# Patient Record
Sex: FEMALE | Race: OTHER | Employment: PART TIME | URBAN - METROPOLITAN AREA
[De-identification: names, ages, dates, MRNs, and addresses within clinical notes are randomized per-mention and may not be internally consistent; named-entity substitution may affect disease eponyms.]

---

## 2019-03-22 ENCOUNTER — APPOINTMENT (OUTPATIENT)
Dept: GENERAL RADIOLOGY | Age: 44
End: 2019-03-22
Attending: PHYSICIAN ASSISTANT
Payer: OTHER GOVERNMENT

## 2019-03-22 ENCOUNTER — HOSPITAL ENCOUNTER (EMERGENCY)
Age: 44
Discharge: HOME | End: 2019-03-22
Attending: EMERGENCY MEDICINE
Payer: OTHER GOVERNMENT

## 2019-03-22 ENCOUNTER — APPOINTMENT (OUTPATIENT)
Dept: CT IMAGING | Age: 44
End: 2019-03-22
Attending: PHYSICIAN ASSISTANT
Payer: OTHER GOVERNMENT

## 2019-03-22 VITALS
HEART RATE: 63 BPM | SYSTOLIC BLOOD PRESSURE: 105 MMHG | TEMPERATURE: 97.9 F | HEIGHT: 66 IN | RESPIRATION RATE: 17 BRPM | BODY MASS INDEX: 23.3 KG/M2 | OXYGEN SATURATION: 100 % | WEIGHT: 145 LBS | DIASTOLIC BLOOD PRESSURE: 82 MMHG

## 2019-03-22 DIAGNOSIS — G44.209 ACUTE NON INTRACTABLE TENSION-TYPE HEADACHE: Primary | ICD-10-CM

## 2019-03-22 DIAGNOSIS — R25.3 MUSCLE TWITCHING: ICD-10-CM

## 2019-03-22 LAB
ALBUMIN SERPL-MCNC: 3.8 G/DL (ref 3.4–5)
ALBUMIN/GLOB SERPL: 1.2 {RATIO} (ref 0.8–1.7)
ALP SERPL-CCNC: 58 U/L (ref 45–117)
ALT SERPL-CCNC: 18 U/L (ref 13–56)
ANION GAP SERPL CALC-SCNC: 7 MMOL/L (ref 3–18)
APPEARANCE UR: CLEAR
AST SERPL-CCNC: 11 U/L (ref 15–37)
BACTERIA URNS QL MICRO: ABNORMAL /HPF
BASOPHILS # BLD: 0 K/UL (ref 0–0.1)
BASOPHILS NFR BLD: 0 % (ref 0–2)
BILIRUB SERPL-MCNC: 0.3 MG/DL (ref 0.2–1)
BILIRUB UR QL: NEGATIVE
BUN SERPL-MCNC: 23 MG/DL (ref 7–18)
BUN/CREAT SERPL: 35 (ref 12–20)
CALCIUM SERPL-MCNC: 8.6 MG/DL (ref 8.5–10.1)
CHLORIDE SERPL-SCNC: 106 MMOL/L (ref 100–108)
CK MB CFR SERPL CALC: 1.3 % (ref 0–4)
CK MB SERPL-MCNC: 1 NG/ML (ref 5–25)
CK SERPL-CCNC: 76 U/L (ref 26–192)
CO2 SERPL-SCNC: 27 MMOL/L (ref 21–32)
COLOR UR: YELLOW
CREAT SERPL-MCNC: 0.66 MG/DL (ref 0.6–1.3)
DIFFERENTIAL METHOD BLD: NORMAL
EOSINOPHIL # BLD: 0.2 K/UL (ref 0–0.4)
EOSINOPHIL NFR BLD: 3 % (ref 0–5)
EPITH CASTS URNS QL MICRO: ABNORMAL /LPF (ref 0–5)
ERYTHROCYTE [DISTWIDTH] IN BLOOD BY AUTOMATED COUNT: 12.5 % (ref 11.6–14.5)
GLOBULIN SER CALC-MCNC: 3.3 G/DL (ref 2–4)
GLUCOSE SERPL-MCNC: 84 MG/DL (ref 74–99)
GLUCOSE UR STRIP.AUTO-MCNC: NEGATIVE MG/DL
HCG UR QL: NEGATIVE
HCT VFR BLD AUTO: 38.4 % (ref 35–45)
HGB BLD-MCNC: 12.4 G/DL (ref 12–16)
HGB UR QL STRIP: ABNORMAL
KETONES UR QL STRIP.AUTO: NEGATIVE MG/DL
LEUKOCYTE ESTERASE UR QL STRIP.AUTO: ABNORMAL
LYMPHOCYTES # BLD: 2.3 K/UL (ref 0.9–3.6)
LYMPHOCYTES NFR BLD: 32 % (ref 21–52)
MAGNESIUM SERPL-MCNC: 1.9 MG/DL (ref 1.6–2.6)
MCH RBC QN AUTO: 29.2 PG (ref 24–34)
MCHC RBC AUTO-ENTMCNC: 32.3 G/DL (ref 31–37)
MCV RBC AUTO: 90.6 FL (ref 74–97)
MONOCYTES # BLD: 0.7 K/UL (ref 0.05–1.2)
MONOCYTES NFR BLD: 9 % (ref 3–10)
MUCOUS THREADS URNS QL MICRO: NEGATIVE /LPF
NEUTS SEG # BLD: 4 K/UL (ref 1.8–8)
NEUTS SEG NFR BLD: 56 % (ref 40–73)
NITRITE UR QL STRIP.AUTO: NEGATIVE
PH UR STRIP: 5 [PH] (ref 5–8)
PLATELET # BLD AUTO: 241 K/UL (ref 135–420)
PMV BLD AUTO: 9.9 FL (ref 9.2–11.8)
POTASSIUM SERPL-SCNC: 3.6 MMOL/L (ref 3.5–5.5)
PROT SERPL-MCNC: 7.1 G/DL (ref 6.4–8.2)
PROT UR STRIP-MCNC: NEGATIVE MG/DL
RBC # BLD AUTO: 4.24 M/UL (ref 4.2–5.3)
RBC #/AREA URNS HPF: ABNORMAL /HPF (ref 0–5)
SODIUM SERPL-SCNC: 140 MMOL/L (ref 136–145)
SP GR UR REFRACTOMETRY: 1.01 (ref 1–1.03)
TROPONIN I SERPL-MCNC: <0.02 NG/ML (ref 0–0.04)
UROBILINOGEN UR QL STRIP.AUTO: 0.2 EU/DL (ref 0.2–1)
WBC # BLD AUTO: 7.1 K/UL (ref 4.6–13.2)
WBC URNS QL MICRO: ABNORMAL /HPF (ref 0–4)

## 2019-03-22 PROCEDURE — 99283 EMERGENCY DEPT VISIT LOW MDM: CPT

## 2019-03-22 PROCEDURE — 70450 CT HEAD/BRAIN W/O DYE: CPT

## 2019-03-22 PROCEDURE — 82550 ASSAY OF CK (CPK): CPT

## 2019-03-22 PROCEDURE — 71046 X-RAY EXAM CHEST 2 VIEWS: CPT

## 2019-03-22 PROCEDURE — 80053 COMPREHEN METABOLIC PANEL: CPT

## 2019-03-22 PROCEDURE — 81001 URINALYSIS AUTO W/SCOPE: CPT

## 2019-03-22 PROCEDURE — 85025 COMPLETE CBC W/AUTO DIFF WBC: CPT

## 2019-03-22 PROCEDURE — 81025 URINE PREGNANCY TEST: CPT

## 2019-03-22 PROCEDURE — 83735 ASSAY OF MAGNESIUM: CPT

## 2019-03-22 PROCEDURE — 99284 EMERGENCY DEPT VISIT MOD MDM: CPT

## 2019-03-22 PROCEDURE — 74011250636 HC RX REV CODE- 250/636: Performed by: PHYSICIAN ASSISTANT

## 2019-03-22 PROCEDURE — 93005 ELECTROCARDIOGRAM TRACING: CPT

## 2019-03-22 RX ORDER — IBUPROFEN 800 MG/1
800 TABLET ORAL
Qty: 20 TAB | Refills: 0 | Status: SHIPPED | OUTPATIENT
Start: 2019-03-22 | End: 2019-03-29

## 2019-03-22 RX ADMIN — SODIUM CHLORIDE 1000 ML: 900 INJECTION, SOLUTION INTRAVENOUS at 20:21

## 2019-03-22 NOTE — ED PROVIDER NOTES
EMERGENCY DEPARTMENT HISTORY AND PHYSICAL EXAM 
 
5:43 PM 
 
 
Date: 3/22/2019 Patient Name: Christopher Mitchell History of Presenting Illness Chief Complaint Patient presents with  
 Headache History Provided By: Patient and Patient's  Chief Complaint: HA, right eye twitching Duration: 1 Days Timing:  Acute Location:  
Quality: Aching Severity: Moderate Modifying Factors:  
Associated Symptoms: Left posterior shoulder tingling, dizziness Additional History (Context):Brooke Hedrick is a 41 y.o. female who presents to the emergency department for evaluation of frontal headache since last night. She states she also has noticed that her right eye has been twitching. The patient states that yesterday her left shoulder was tingling. No recent injuries, trauma, heavy lifting, or change in activity. Patient admits she has headaches from time to time, but she cannot enumerate how many or how often. She also reports intermittent dizziness couple of weeks ago. When this occurred, she states that she had a little difficulty walking. She also has intermittent tinnitus. No recent head injury, visual changes, photophobia, phonophobia, history of hypertension, history of hyperlipidemia, history of cardiac disorders. Patient does not take any medications. No recent fevers, chills, nausea, vomiting, diarrhea, chest pain, shortness of breath, cough, URI symptoms, urinary symptoms, leg swelling, exogenous hormone use, hemoptysis, recent travel or immobilization, recent surgery. No other complaints at this time. PCP:  Jim, Not On File Current Facility-Administered Medications Medication Dose Route Frequency Provider Last Rate Last Dose  sodium chloride 0.9 % bolus infusion 1,000 mL  1,000 mL IntraVENous ONCE Kong Blount PA-C 1,000 mL/hr at 03/22/19 2021 1,000 mL at 03/22/19 2021 Current Outpatient Medications Medication Sig Dispense Refill  ibuprofen (MOTRIN) 800 mg tablet Take 1 Tab by mouth every six (6) hours as needed for Pain for up to 7 days. 20 Tab 0 Past History Past Medical History: 
History reviewed. No pertinent past medical history. Past Surgical History: 
History reviewed. No pertinent surgical history. Family History: 
History reviewed. No pertinent family history. Social History: 
Social History Tobacco Use  Smoking status: Former Smoker  Smokeless tobacco: Never Used Substance Use Topics  Alcohol use: Not on file  Drug use: Not on file Allergies: 
No Known Allergies Review of Systems Review of Systems Constitutional: Negative for chills and fever. HENT: Positive for tinnitus. Negative for congestion, rhinorrhea and sore throat. Eyes: Negative for photophobia and visual disturbance. Respiratory: Negative for cough and shortness of breath. Cardiovascular: Negative for chest pain. Gastrointestinal: Negative for abdominal pain, blood in stool, constipation, diarrhea, nausea and vomiting. Genitourinary: Negative for dysuria, frequency and hematuria. Musculoskeletal: Negative for back pain and myalgias. Skin: Negative for rash and wound. Neurological: Positive for dizziness and headaches. All other systems reviewed and are negative. Physical Exam  
 
Visit Vitals /82 (BP 1 Location: Right arm, BP Patient Position: At rest) Pulse 63 Temp 97.9 °F (36.6 °C) Resp 17 Ht 5' 6\" (1.676 m) Wt 65.8 kg (145 lb) LMP 03/08/2019 SpO2 100% BMI 23.40 kg/m² Physical Exam  
Constitutional: She is oriented to person, place, and time. She appears well-developed and well-nourished. No distress. HENT:  
Head: Normocephalic and atraumatic. Right Ear: Tympanic membrane normal. No middle ear effusion. Left Ear: Tympanic membrane normal.  No middle ear effusion. Nose: Mucosal edema and rhinorrhea present. Eyes: Conjunctivae are normal. Right eye exhibits no discharge. Left eye exhibits no discharge. Neck: Normal range of motion. Neck supple. No thyromegaly present. Cardiovascular: Normal rate, regular rhythm and normal heart sounds. Pulmonary/Chest: Effort normal and breath sounds normal. No respiratory distress. She exhibits no tenderness. Lungs CTAB Musculoskeletal: She exhibits no edema or deformity. Lymphadenopathy:  
  She has no cervical adenopathy. Neurological: She is alert and oriented to person, place, and time. She has normal reflexes. No cranial nerve deficit. Coordination normal.  
Pt is awake, alert, oriented x 3.  CNs 2-12 intact and normal.  No facial droop. Normal speech. Pt is answering questions and following commands appropriately. Pt ambulatory with even, steady gait, moving BUE and BLE with equal strength and intact distal neurovascular status. Skin: Skin is warm and dry. She is not diaphoretic. Psychiatric: She has a normal mood and affect. Nursing note and vitals reviewed. Diagnostic Study Results Labs - Recent Results (from the past 12 hour(s)) CBC WITH AUTOMATED DIFF Collection Time: 03/22/19  4:25 PM  
Result Value Ref Range WBC 7.1 4.6 - 13.2 K/uL  
 RBC 4.24 4.20 - 5.30 M/uL  
 HGB 12.4 12.0 - 16.0 g/dL HCT 38.4 35.0 - 45.0 % MCV 90.6 74.0 - 97.0 FL  
 MCH 29.2 24.0 - 34.0 PG  
 MCHC 32.3 31.0 - 37.0 g/dL  
 RDW 12.5 11.6 - 14.5 % PLATELET 381 015 - 114 K/uL MPV 9.9 9.2 - 11.8 FL  
 NEUTROPHILS 56 40 - 73 % LYMPHOCYTES 32 21 - 52 % MONOCYTES 9 3 - 10 % EOSINOPHILS 3 0 - 5 % BASOPHILS 0 0 - 2 %  
 ABS. NEUTROPHILS 4.0 1.8 - 8.0 K/UL  
 ABS. LYMPHOCYTES 2.3 0.9 - 3.6 K/UL  
 ABS. MONOCYTES 0.7 0.05 - 1.2 K/UL  
 ABS. EOSINOPHILS 0.2 0.0 - 0.4 K/UL  
 ABS. BASOPHILS 0.0 0.0 - 0.1 K/UL  
 DF AUTOMATED METABOLIC PANEL, COMPREHENSIVE Collection Time: 03/22/19  4:25 PM  
Result Value Ref Range Sodium 140 136 - 145 mmol/L Potassium 3.6 3.5 - 5.5 mmol/L Chloride 106 100 - 108 mmol/L  
 CO2 27 21 - 32 mmol/L Anion gap 7 3.0 - 18 mmol/L Glucose 84 74 - 99 mg/dL BUN 23 (H) 7.0 - 18 MG/DL Creatinine 0.66 0.6 - 1.3 MG/DL  
 BUN/Creatinine ratio 35 (H) 12 - 20 GFR est AA >60 >60 ml/min/1.73m2 GFR est non-AA >60 >60 ml/min/1.73m2 Calcium 8.6 8.5 - 10.1 MG/DL Bilirubin, total 0.3 0.2 - 1.0 MG/DL  
 ALT (SGPT) 18 13 - 56 U/L  
 AST (SGOT) 11 (L) 15 - 37 U/L Alk. phosphatase 58 45 - 117 U/L Protein, total 7.1 6.4 - 8.2 g/dL Albumin 3.8 3.4 - 5.0 g/dL Globulin 3.3 2.0 - 4.0 g/dL A-G Ratio 1.2 0.8 - 1.7 MAGNESIUM Collection Time: 03/22/19  4:25 PM  
Result Value Ref Range Magnesium 1.9 1.6 - 2.6 mg/dL URINALYSIS W/ RFLX MICROSCOPIC Collection Time: 03/22/19  4:25 PM  
Result Value Ref Range Color YELLOW Appearance CLEAR Specific gravity 1.014 1.005 - 1.030    
 pH (UA) 5.0 5.0 - 8.0 Protein NEGATIVE  NEG mg/dL Glucose NEGATIVE  NEG mg/dL Ketone NEGATIVE  NEG mg/dL Bilirubin NEGATIVE  NEG Blood TRACE (A) NEG Urobilinogen 0.2 0.2 - 1.0 EU/dL Nitrites NEGATIVE  NEG Leukocyte Esterase TRACE (A) NEG    
HCG URINE, QL Collection Time: 03/22/19  4:25 PM  
Result Value Ref Range HCG urine, QL NEGATIVE  NEG    
URINE MICROSCOPIC ONLY Collection Time: 03/22/19  4:25 PM  
Result Value Ref Range WBC 0 to 2 0 - 4 /hpf  
 RBC 0 to 2 0 - 5 /hpf Epithelial cells FEW 0 - 5 /lpf Bacteria FEW (A) NEG /hpf Mucus NEGATIVE  NEG /lpf CARDIAC PANEL,(CK, CKMB & TROPONIN) Collection Time: 03/22/19  4:25 PM  
Result Value Ref Range CK 76 26 - 192 U/L  
 CK - MB 1.0 <3.6 ng/ml CK-MB Index 1.3 0.0 - 4.0 % Troponin-I, QT <0.02 0.0 - 0.045 NG/ML  
EKG, 12 LEAD, INITIAL Collection Time: 03/22/19  7:41 PM  
Result Value Ref Range Ventricular Rate 57 BPM  
 Atrial Rate 57 BPM  
 P-R Interval 164 ms QRS Duration 76 ms  
 Q-T Interval 434 ms QTC Calculation (Bezet) 422 ms Calculated P Axis 34 degrees Calculated R Axis 63 degrees Calculated T Axis 76 degrees Diagnosis Sinus bradycardia Cannot rule out Anterior infarct , age undetermined Abnormal ECG No previous ECGs available Radiologic Studies - No results found. Medical Decision Making I am the first provider for this patient. I reviewed the vital signs, available nursing notes, past medical history, past surgical history, family history and social history. Vital Signs-Reviewed the patient's vital signs. Pulse Oximetry Analysis -  100% on room air (Interpretation) EKG: Interpreted by the EP. Time Interpreted:  
 Rate:  
 Rhythm: SinusBradycardia Interpretation: 
 Comparison:  
 
Records Reviewed: Nursing Notes and Old Medical Records (Time of Review: 5:43 PM) ED Course: Progress Notes, Reevaluation, and Consults: 
 
Provider Notes (Medical Decision Making):  
differential diagnosis: Tension headache, migraine headache, atypical migraine, anxiety, allergic rhinitis, electrolyte abnormality Plan: Patient presents ambulatory in no acute distress with normal vitals. No significant past medical history. Patient has a normal physical exam.  She has a normal neurologic exam.  CT head preliminarily negative. Urinalysis without infection. EKG without arrhythmia. Cardiac panel within normal limits. Mildly elevated BUN to creatinine ratio with otherwise normal labs. Discussed case with Dr. Milvia Wheat. Per Dr. Milvia Wheat, no indication for emergent tele-neurology consultation. No findings consistent with CVA. Will DC home with neurology follow-up and Motrin for headache. At this time, patient is stable and appropriate for discharge home. Patient demonstrates understanding of current diagnoses and is in agreement with the treatment plan.   They are advised that while the likelihood of serious underlying condition is low at this point given the evaluation performed today, we cannot fully rule it out. They are advised to immediately return with any new symptoms or worsening of current condition. All questions have been answered. Patient is given educational material regarding their diagnoses, including danger symptoms and when to return to the ED. Diagnosis Clinical Impression: 1. Acute non intractable tension-type headache 2. Muscle twitching Disposition: [address] Follow-up Information Follow up With Specialties Details Why Contact Info Neurology [place]  Call For follow-up regarding your headaches and eye twitching [address]) [address] [phone] [facility] EMERGENCY DEPT Emergency Medicine Go to As needed, If symptoms worsen [address] 
[phone] Patient's Medications Start Taking IBUPROFEN (MOTRIN) 800 MG TABLET    Take 1 Tab by mouth every six (6) hours as needed for Pain for up to 7 days. Continue Taking No medications on file These Medications have changed No medications on file Stop Taking No medications on file  
 
_______________________________

## 2019-03-23 NOTE — DISCHARGE INSTRUCTIONS
Patient Education        Dolor de ruma por tensión: Instrucciones de cuidado - [ Tension Headache: Care Instructions ]  Instrucciones de [address] de los chava de [place] son por tensión. Estos chava de [place] tienden a repetirse, en especial si usted está bajo estrés. Un dolor de ruma por tensión podría causar dolor o ashley sensación de presión en toda la ruma. Es probable que no pueda determinar con precisión el centro del dolor. Si sigue teniendo chava de ruma por tensión, lo mejor que puede hacer para limitarlos es determinar qué los causa y hacer cambios en esas áreas. La atención de seguimiento es ashley parte clave de rodgers tratamiento y seguridad. Asegúrese de hacer y acudir a todas las citas, y llame a rodgers médico si está teniendo problemas. También es ashley buena idea saber los resultados de anton exámenes y mantener ashley lista de los medicamentos que artur. ¿Cómo puede cuidarse en el hogar? · Descanse en un cuarto tranquilo y oscuro con un paño frío sobre la frente hasta que desaparezca el dolor de [place]. Cierre los ojos y trate de relajarse o dormirse. No poppy televisión ni shreyas. Evite usar la computadora. · Utilice ashley toalla húmeda tibia o ashley almohadilla térmica ajustada a baja temperatura para relajar los músculos rígidos del andre y los hombros.  · Pídale a alguien que le judith masajes suaves en el andre y los hombros.  · [place] los analgésicos (medicamentos para el dolor) exactamente según las indicaciones. ? Si el médico le recetó un analgésico, tómelo según las indicaciones. ? Si no está tomando un analgésico recetado, pregúntele a rodgers médico si puede ebonie erica de [place]. · Tenga cuidado de no ebonie analgésicos con mayor frecuencia que la permitida en las indicaciones porque los chava de ruma podrían empeorar o aparecer con mayor frecuencia ashley vez que el medicamento pierda rodgers [place].   · Si le da otro dolor de ruma por tensión, deje de hacer lo que esté haciendo y siéntese tranquilo foreign un momento. Cierre los ojos y respire lentamente. Trate de [employer] de la ruma y del andre. · Preste atención a los nuevos síntomas que aparecen con el dolor de [place], [place], debilidad o entumecimiento, cambios en la visión o confusión. Podrían ser señales de un problema más grave. Para ayudar a prevenir los chava de ruma  · [employer] un diario de anton chava de ruma para poder averiguar qué los produce. Evitar los desencadenantes podría ayudar a prevenir los chava de [place]. [place]ote cuándo empieza cada dolor de [place], cuánto dura y cómo es el dolor (palpitante, levon, punzante o sordo). Ponga en la lista cualquier cosa que pudiera roshan desencadenado el dolor de ruma, peng carlos manuel estrés físico o emocional o estar ansioso o deprimido. Otros desencadenantes posibles son Joey Guillermo, la lori, la fatiga, Ernesta Riff y la distensión muscular. · Encuentre maneras saludables de [employer]. Los chava de [place] son más comunes foreign o chino después de un momento estresante. Tómese un tiempo para relajarse antes y después de hacer algo que le haya causado un dolor de ruma en el pasado. · Julius ejercicio a diario para aliviar el estrés. Hacer ejercicios de relajación podría ayudarle a reducir la tensión. · Duerma lo suficiente. · Coma con regularidad y chelsie. Largos períodos sin comer pueden provocar un dolor de ruma. · Regálese un masaje. Algunas personas encuentran que los masajes son Bobetta Marshall para aliviar la tensión. · Trate de mantener anton músculos relajados mediante ashley buena postura. Revise si tiene [employer] de la [place], la zara, el andre y los hombros y trate de relajarlos. Cuando se siente en un escritorio, cambie de posición con frecuencia y estírese por 27 segundos cada hora. · Reduzca la fatiga ocular a causa de la computadora parpadeando con frecuencia y apartando la mirada de la pantalla a menudo.  Asegúrese de [employer] adecuados y de que rodgers monitor esté colocado de manera correcta, carlos manuel a un brazo de distancia. ¿Cuándo debe pedir ayuda? Llame al 911 en cualquier momento que piense que puede necesitar atención de [place]. Por ejemplo, llame si:    · Tiene señales de un ataque cerebral. Estas pueden incluir:  ? Entumecimiento, parálisis o debilidad repentinos en la zara, el brazo o la pierna, sobre todo si ocurre en un solo lado del cuerpo. ? Cambios repentinos en la visión. ? Dificultades repentinas para hablar. ? Confusión repentina o dificultad para comprender frases sencillas. ? Problemas repentinos para caminar o mantener el equilibrio. ? Dolor de [place] intenso y repentino, distinto de los chava de ruma anteriores.    Llame a rodgers médico ahora mismo o busque atención médica inmediata si:    · Tiene náuseas y vómito nuevos o empeoran los que ya tenía.     · Tiene fiebre nueva o más michael.     · Rodgers dolor de ruma empeora mucho.    Preste especial atención a los cambios en rodgers hollie y asegúrese de comunicarse con rodgers médico si:    · No mejora después de 2 días (48 horas). ¿Dónde puede encontrar más información en inglés? Dequan Sinks a http://lonny-wanda.info/. Escriba C544 en la búsqueda para aprender más acerca de \"Dolor de ruma por tensión: Instrucciones de cuidado - [ Tension Headache: Care Instructions ]. \"  Revisado: 3 quique, 2018  Versión del contenido: 11.9  © 6098-2845 [employer]. Las instrucciones de cuidado fueron adaptadas bajo licencia por [employer] (which disclaims liability or warranty for this information). Si usted tiene [place] afección médica o sobre estas instrucciones, siempre pregunte a rodgers profesional de hollie. [facility] niega toda garantía o responsabilidad por rodgers uso de esta información.

## 2019-03-23 NOTE — ED NOTES
I have reviewed discharge instructions with the patient and spouse. The patient and spouse verbalized understanding. Pt is Congolese speaker with  who speaks both [place] and [place].  verbalized his understanding of the discharge instructions. Discharge instructions are in [place]

## 2019-03-24 LAB
ATRIAL RATE: 57 BPM
CALCULATED P AXIS, ECG09: 34 DEGREES
CALCULATED R AXIS, ECG10: 63 DEGREES
CALCULATED T AXIS, ECG11: 76 DEGREES
DIAGNOSIS, 93000: NORMAL
P-R INTERVAL, ECG05: 164 MS
Q-T INTERVAL, ECG07: 434 MS
QRS DURATION, ECG06: 76 MS
QTC CALCULATION (BEZET), ECG08: 422 MS
VENTRICULAR RATE, ECG03: 57 BPM